# Patient Record
Sex: MALE | Race: BLACK OR AFRICAN AMERICAN | Employment: UNEMPLOYED | URBAN - METROPOLITAN AREA
[De-identification: names, ages, dates, MRNs, and addresses within clinical notes are randomized per-mention and may not be internally consistent; named-entity substitution may affect disease eponyms.]

---

## 2024-09-10 ENCOUNTER — HOSPITAL ENCOUNTER (INPATIENT)
Age: 64
LOS: 1 days | DRG: 287 | End: 2024-09-10
Attending: EMERGENCY MEDICINE | Admitting: INTERNAL MEDICINE
Payer: MEDICARE

## 2024-09-10 ENCOUNTER — APPOINTMENT (OUTPATIENT)
Dept: GENERAL RADIOLOGY | Age: 64
DRG: 287 | End: 2024-09-10
Payer: MEDICARE

## 2024-09-10 DIAGNOSIS — I21.4 NSTEMI (NON-ST ELEVATED MYOCARDIAL INFARCTION) (HCC): ICD-10-CM

## 2024-09-10 DIAGNOSIS — I21.3 ST ELEVATION MYOCARDIAL INFARCTION (STEMI), UNSPECIFIED ARTERY (HCC): Primary | ICD-10-CM

## 2024-09-10 DIAGNOSIS — R07.9 CHEST PAIN, UNSPECIFIED TYPE: ICD-10-CM

## 2024-09-10 DIAGNOSIS — I21.3 STEMI (ST ELEVATION MYOCARDIAL INFARCTION) (HCC): ICD-10-CM

## 2024-09-10 PROBLEM — I25.10 CAD (CORONARY ARTERY DISEASE): Status: ACTIVE | Noted: 2024-09-10

## 2024-09-10 LAB
ANION GAP SERPL CALCULATED.3IONS-SCNC: 8 MMOL/L (ref 3–16)
ANTI-XA UNFRAC HEPARIN: <0.1 IU/ML (ref 0.3–0.7)
APTT BLD: 31.6 SEC (ref 22.1–36.4)
BASOPHILS # BLD: 0 K/UL (ref 0–0.2)
BASOPHILS NFR BLD: 0.4 %
BUN SERPL-MCNC: 13 MG/DL (ref 7–20)
CALCIUM SERPL-MCNC: 9.4 MG/DL (ref 8.3–10.6)
CHLORIDE SERPL-SCNC: 103 MMOL/L (ref 99–110)
CO2 SERPL-SCNC: 24 MMOL/L (ref 21–32)
CREAT SERPL-MCNC: 1.2 MG/DL (ref 0.8–1.3)
DEPRECATED RDW RBC AUTO: 15.1 % (ref 12.4–15.4)
EKG ATRIAL RATE: 105 BPM
EKG ATRIAL RATE: 106 BPM
EKG DIAGNOSIS: NORMAL
EKG DIAGNOSIS: NORMAL
EKG P AXIS: 43 DEGREES
EKG P AXIS: 47 DEGREES
EKG P-R INTERVAL: 176 MS
EKG P-R INTERVAL: 186 MS
EKG Q-T INTERVAL: 334 MS
EKG Q-T INTERVAL: 338 MS
EKG QRS DURATION: 118 MS
EKG QRS DURATION: 118 MS
EKG QTC CALCULATION (BAZETT): 441 MS
EKG QTC CALCULATION (BAZETT): 448 MS
EKG R AXIS: 85 DEGREES
EKG R AXIS: 88 DEGREES
EKG T AXIS: 231 DEGREES
EKG T AXIS: 232 DEGREES
EKG VENTRICULAR RATE: 105 BPM
EKG VENTRICULAR RATE: 106 BPM
EOSINOPHIL # BLD: 0.1 K/UL (ref 0–0.6)
EOSINOPHIL NFR BLD: 2.3 %
GFR SERPLBLD CREATININE-BSD FMLA CKD-EPI: 67 ML/MIN/{1.73_M2}
GLUCOSE SERPL-MCNC: 125 MG/DL (ref 70–99)
HCT VFR BLD AUTO: 35.7 % (ref 40.5–52.5)
HGB BLD-MCNC: 11.9 G/DL (ref 13.5–17.5)
INR PPP: 1 (ref 0.85–1.15)
LYMPHOCYTES # BLD: 1.6 K/UL (ref 1–5.1)
LYMPHOCYTES NFR BLD: 25.4 %
MCH RBC QN AUTO: 29.5 PG (ref 26–34)
MCHC RBC AUTO-ENTMCNC: 33.4 G/DL (ref 31–36)
MCV RBC AUTO: 88.4 FL (ref 80–100)
MONOCYTES # BLD: 0.7 K/UL (ref 0–1.3)
MONOCYTES NFR BLD: 11.3 %
NEUTROPHILS # BLD: 3.8 K/UL (ref 1.7–7.7)
NEUTROPHILS NFR BLD: 60.6 %
NT-PROBNP SERPL-MCNC: 1636 PG/ML (ref 0–124)
PLATELET # BLD AUTO: 405 K/UL (ref 135–450)
PMV BLD AUTO: 8.3 FL (ref 5–10.5)
POC ACT LR: 168 SEC
POC ACT LR: 202 SEC
POTASSIUM SERPL-SCNC: 4.8 MMOL/L (ref 3.5–5.1)
PROTHROMBIN TIME: 13.4 SEC (ref 11.9–14.9)
RBC # BLD AUTO: 4.04 M/UL (ref 4.2–5.9)
SODIUM SERPL-SCNC: 135 MMOL/L (ref 136–145)
TROPONIN, HIGH SENSITIVITY: 371 NG/L (ref 0–22)
TROPONIN, HIGH SENSITIVITY: 443 NG/L (ref 0–22)
WBC # BLD AUTO: 6.3 K/UL (ref 4–11)

## 2024-09-10 PROCEDURE — 99153 MOD SED SAME PHYS/QHP EA: CPT | Performed by: INTERNAL MEDICINE

## 2024-09-10 PROCEDURE — 83880 ASSAY OF NATRIURETIC PEPTIDE: CPT

## 2024-09-10 PROCEDURE — 2709999900 HC NON-CHARGEABLE SUPPLY: Performed by: INTERNAL MEDICINE

## 2024-09-10 PROCEDURE — 1200000000 HC SEMI PRIVATE

## 2024-09-10 PROCEDURE — 2700000000 HC OXYGEN THERAPY PER DAY

## 2024-09-10 PROCEDURE — C1769 GUIDE WIRE: HCPCS | Performed by: INTERNAL MEDICINE

## 2024-09-10 PROCEDURE — 93567 NJX CAR CTH SPRVLV AORTGRPHY: CPT | Performed by: INTERNAL MEDICINE

## 2024-09-10 PROCEDURE — 85347 COAGULATION TIME ACTIVATED: CPT

## 2024-09-10 PROCEDURE — 85025 COMPLETE CBC W/AUTO DIFF WBC: CPT

## 2024-09-10 PROCEDURE — 5A1223Z PERFORMANCE OF CARDIAC PACING, CONTINUOUS: ICD-10-PCS | Performed by: INTERNAL MEDICINE

## 2024-09-10 PROCEDURE — B2131ZZ FLUOROSCOPY OF MULTIPLE CORONARY ARTERY BYPASS GRAFTS USING LOW OSMOLAR CONTRAST: ICD-10-PCS | Performed by: INTERNAL MEDICINE

## 2024-09-10 PROCEDURE — 85520 HEPARIN ASSAY: CPT

## 2024-09-10 PROCEDURE — 96374 THER/PROPH/DIAG INJ IV PUSH: CPT

## 2024-09-10 PROCEDURE — 92950 HEART/LUNG RESUSCITATION CPR: CPT

## 2024-09-10 PROCEDURE — B211YZZ FLUOROSCOPY OF MULTIPLE CORONARY ARTERIES USING OTHER CONTRAST: ICD-10-PCS | Performed by: INTERNAL MEDICINE

## 2024-09-10 PROCEDURE — 6360000002 HC RX W HCPCS: Performed by: EMERGENCY MEDICINE

## 2024-09-10 PROCEDURE — 99152 MOD SED SAME PHYS/QHP 5/>YRS: CPT | Performed by: INTERNAL MEDICINE

## 2024-09-10 PROCEDURE — 94761 N-INVAS EAR/PLS OXIMETRY MLT: CPT

## 2024-09-10 PROCEDURE — 93459 L HRT ART/GRFT ANGIO: CPT | Performed by: INTERNAL MEDICINE

## 2024-09-10 PROCEDURE — 0BH17EZ INSERTION OF ENDOTRACHEAL AIRWAY INTO TRACHEA, VIA NATURAL OR ARTIFICIAL OPENING: ICD-10-PCS | Performed by: INTERNAL MEDICINE

## 2024-09-10 PROCEDURE — 31500 INSERT EMERGENCY AIRWAY: CPT

## 2024-09-10 PROCEDURE — 85610 PROTHROMBIN TIME: CPT

## 2024-09-10 PROCEDURE — 85730 THROMBOPLASTIN TIME PARTIAL: CPT

## 2024-09-10 PROCEDURE — 71045 X-RAY EXAM CHEST 1 VIEW: CPT

## 2024-09-10 PROCEDURE — 6360000002 HC RX W HCPCS: Performed by: INTERNAL MEDICINE

## 2024-09-10 PROCEDURE — 99285 EMERGENCY DEPT VISIT HI MDM: CPT

## 2024-09-10 PROCEDURE — 2500000003 HC RX 250 WO HCPCS: Performed by: INTERNAL MEDICINE

## 2024-09-10 PROCEDURE — 80048 BASIC METABOLIC PNL TOTAL CA: CPT

## 2024-09-10 PROCEDURE — 5A12012 PERFORMANCE OF CARDIAC OUTPUT, SINGLE, MANUAL: ICD-10-PCS | Performed by: INTERNAL MEDICINE

## 2024-09-10 PROCEDURE — C1894 INTRO/SHEATH, NON-LASER: HCPCS | Performed by: INTERNAL MEDICINE

## 2024-09-10 PROCEDURE — 4A023N7 MEASUREMENT OF CARDIAC SAMPLING AND PRESSURE, LEFT HEART, PERCUTANEOUS APPROACH: ICD-10-PCS | Performed by: INTERNAL MEDICINE

## 2024-09-10 PROCEDURE — 93005 ELECTROCARDIOGRAM TRACING: CPT | Performed by: EMERGENCY MEDICINE

## 2024-09-10 PROCEDURE — 6370000000 HC RX 637 (ALT 250 FOR IP): Performed by: EMERGENCY MEDICINE

## 2024-09-10 PROCEDURE — 84484 ASSAY OF TROPONIN QUANT: CPT

## 2024-09-10 RX ORDER — ASPIRIN 81 MG/1
324 TABLET, CHEWABLE ORAL ONCE
Status: DISCONTINUED | OUTPATIENT
Start: 2024-09-10 | End: 2024-09-10

## 2024-09-10 RX ORDER — AMLODIPINE BESYLATE 2.5 MG/1
2.5 TABLET ORAL DAILY
COMMUNITY

## 2024-09-10 RX ORDER — RANOLAZINE 500 MG/1
500 TABLET, EXTENDED RELEASE ORAL 2 TIMES DAILY
COMMUNITY

## 2024-09-10 RX ORDER — ISOSORBIDE MONONITRATE 30 MG/1
30 TABLET, EXTENDED RELEASE ORAL DAILY
COMMUNITY

## 2024-09-10 RX ORDER — NITROGLYCERIN 0.4 MG/1
0.4 TABLET SUBLINGUAL ONCE
Status: COMPLETED | OUTPATIENT
Start: 2024-09-10 | End: 2024-09-10

## 2024-09-10 RX ORDER — HEPARIN SODIUM 10000 [USP'U]/100ML
5-30 INJECTION, SOLUTION INTRAVENOUS CONTINUOUS
Status: DISCONTINUED | OUTPATIENT
Start: 2024-09-10 | End: 2024-09-12 | Stop reason: HOSPADM

## 2024-09-10 RX ORDER — SUCCINYLCHOLINE CHLORIDE 20 MG/ML
INJECTION INTRAMUSCULAR; INTRAVENOUS PRN
Status: DISCONTINUED | OUTPATIENT
Start: 2024-09-10 | End: 2024-09-10 | Stop reason: HOSPADM

## 2024-09-10 RX ORDER — AMIODARONE HYDROCHLORIDE 150 MG/3ML
INJECTION, SOLUTION INTRAVENOUS PRN
Status: DISCONTINUED | OUTPATIENT
Start: 2024-09-10 | End: 2024-09-10 | Stop reason: HOSPADM

## 2024-09-10 RX ORDER — VALSARTAN 40 MG/1
40 TABLET ORAL DAILY
COMMUNITY

## 2024-09-10 RX ORDER — ONDANSETRON 2 MG/ML
4 INJECTION INTRAMUSCULAR; INTRAVENOUS ONCE
Status: COMPLETED | OUTPATIENT
Start: 2024-09-10 | End: 2024-09-10

## 2024-09-10 RX ORDER — HEPARIN SODIUM 1000 [USP'U]/ML
30 INJECTION, SOLUTION INTRAVENOUS; SUBCUTANEOUS PRN
Status: DISCONTINUED | OUTPATIENT
Start: 2024-09-10 | End: 2024-09-12 | Stop reason: HOSPADM

## 2024-09-10 RX ORDER — HEPARIN SODIUM 1000 [USP'U]/ML
60 INJECTION, SOLUTION INTRAVENOUS; SUBCUTANEOUS ONCE
Status: COMPLETED | OUTPATIENT
Start: 2024-09-10 | End: 2024-09-10

## 2024-09-10 RX ORDER — MIDAZOLAM HYDROCHLORIDE 1 MG/ML
INJECTION INTRAMUSCULAR; INTRAVENOUS PRN
Status: DISCONTINUED | OUTPATIENT
Start: 2024-09-10 | End: 2024-09-10 | Stop reason: HOSPADM

## 2024-09-10 RX ORDER — ATROPINE SULFATE 0.1 MG/ML
INJECTION INTRAVENOUS PRN
Status: DISCONTINUED | OUTPATIENT
Start: 2024-09-10 | End: 2024-09-10 | Stop reason: HOSPADM

## 2024-09-10 RX ORDER — ISOSORBIDE MONONITRATE 60 MG/1
60 TABLET, EXTENDED RELEASE ORAL DAILY
COMMUNITY

## 2024-09-10 RX ORDER — EPINEPHRINE 1 MG/ML(1)
AMPUL (ML) INJECTION PRN
Status: DISCONTINUED | OUTPATIENT
Start: 2024-09-10 | End: 2024-09-10 | Stop reason: HOSPADM

## 2024-09-10 RX ORDER — FUROSEMIDE 10 MG/ML
INJECTION INTRAMUSCULAR; INTRAVENOUS PRN
Status: DISCONTINUED | OUTPATIENT
Start: 2024-09-10 | End: 2024-09-10 | Stop reason: HOSPADM

## 2024-09-10 RX ORDER — HEPARIN SODIUM 1000 [USP'U]/ML
INJECTION, SOLUTION INTRAVENOUS; SUBCUTANEOUS PRN
Status: DISCONTINUED | OUTPATIENT
Start: 2024-09-10 | End: 2024-09-10 | Stop reason: HOSPADM

## 2024-09-10 RX ORDER — HEPARIN SODIUM 1000 [USP'U]/ML
60 INJECTION, SOLUTION INTRAVENOUS; SUBCUTANEOUS PRN
Status: DISCONTINUED | OUTPATIENT
Start: 2024-09-10 | End: 2024-09-12 | Stop reason: HOSPADM

## 2024-09-10 RX ORDER — LIDOCAINE HYDROCHLORIDE 10 MG/ML
INJECTION, SOLUTION EPIDURAL; INFILTRATION; INTRACAUDAL; PERINEURAL PRN
Status: DISCONTINUED | OUTPATIENT
Start: 2024-09-10 | End: 2024-09-10 | Stop reason: HOSPADM

## 2024-09-10 RX ORDER — DIGOXIN 0.25 MG/ML
INJECTION INTRAMUSCULAR; INTRAVENOUS PRN
Status: DISCONTINUED | OUTPATIENT
Start: 2024-09-10 | End: 2024-09-10 | Stop reason: HOSPADM

## 2024-09-10 RX ORDER — NITROGLYCERIN 0.4 MG/1
0.4 TABLET SUBLINGUAL EVERY 5 MIN PRN
COMMUNITY

## 2024-09-10 RX ADMIN — HEPARIN SODIUM 12 UNITS/KG/HR: 10000 INJECTION, SOLUTION INTRAVENOUS at 05:48

## 2024-09-10 RX ADMIN — ONDANSETRON 4 MG: 2 INJECTION INTRAMUSCULAR; INTRAVENOUS at 06:07

## 2024-09-10 RX ADMIN — HEPARIN SODIUM 3500 UNITS: 1000 INJECTION INTRAVENOUS; SUBCUTANEOUS at 05:44

## 2024-09-10 RX ADMIN — NITROGLYCERIN 0.4 MG: 0.4 TABLET, ORALLY DISINTEGRATING SUBLINGUAL at 05:44

## 2024-09-10 ASSESSMENT — PAIN - FUNCTIONAL ASSESSMENT: PAIN_FUNCTIONAL_ASSESSMENT: 0-10

## 2024-09-10 ASSESSMENT — PAIN DESCRIPTION - ORIENTATION: ORIENTATION: MID

## 2024-09-10 ASSESSMENT — LIFESTYLE VARIABLES
HOW OFTEN DO YOU HAVE A DRINK CONTAINING ALCOHOL: NEVER
HOW MANY STANDARD DRINKS CONTAINING ALCOHOL DO YOU HAVE ON A TYPICAL DAY: PATIENT DOES NOT DRINK

## 2024-09-10 ASSESSMENT — PAIN SCALES - GENERAL: PAINLEVEL_OUTOF10: 8

## 2024-09-10 ASSESSMENT — PAIN DESCRIPTION - LOCATION
LOCATION: CHEST
LOCATION: CHEST

## 2024-09-10 ASSESSMENT — PAIN DESCRIPTION - DESCRIPTORS: DESCRIPTORS: STABBING

## 2024-09-10 NOTE — CONSULTS
The [facility]    Cardiology Consult/H&P  Consulting Cardiologist Daniel Coronel MD , [facility]  Referring Provider:  No primary care provider on file.    9/10/2024, 8:39 AM    Chief Complaint   Patient presents with    Chest Pain     Chest pain gave 325 of Asprin en route, d/t language barrier, could not obtain background known TB outbreak in community started about yesterday morning      Primary Cardiologist:  Asked by Daniel Coronel MD/No primary care provider on file. to evaluate his patient    Reason for consult chest pain non-STEMI    History of Present Illness:   Jose Lepe is a 64 y.o. male presented to the emergency department with several hours of continuing chest pains.  Shortness of breath dyspnea and emesis.  He has a known history of CAD with previous superior CAD did undergo bypass graft surgery 6 vessel in 2018 that being the LIMA to the LAD.  Saphenous great vein graft obtuse marginal and twos marginal 2 sequential.  Saphenous vein graft diagonal 1 PDA in a.m. at [facility].  History of paroxysmal atrial fibrillation hypertension diabetes polycystic kidney disease and he has had also several coronary stents.  In the emergency department the EKG did not show michelle STEMI but shows significant ST and T wave changes with tachycardia.  We elected that to bring the patient to the cardiac Cath Lab because the instability of his rhythm and instability of his symptoms and suggestion of acute severe ischemia.  There is language barrier and then the patient speaks Fulani sand Swiss.  We did not have interpreters around.  To the Cath Lab for assessment and treatment.       Past Medical History:   has a past medical history of CAD (coronary artery disease), Hyperlipidemia, and Hypertension.    Surgical History:   has a past surgical history that includes Cardiac surgery.     Social History:   reports that he has never smoked. He has never used smokeless tobacco. He  (APRESOLINE) 50 MG tablet Take 50 mg by mouth 3 times daily  Patient not taking: Reported on 9/10/2024 6/1/20   Unique Decker MD   ibuprofen (ADVIL;MOTRIN) 200 MG tablet Take 200 mg by mouth every 6 hours as needed  Patient not taking: Reported on 9/10/2024    Unique Decker MD   metoprolol succinate (TOPROL XL) 100 MG extended release tablet TAKE 1 TABLET BY MOUTH EVERY DAY  Patient not taking: Reported on 9/10/2024 1/5/21   Unique Decker MD        Current Medications:  Current Facility-Administered Medications   Medication Dose Route Frequency Provider Last Rate Last Admin    heparin (porcine) injection 3,500 Units  60 Units/kg IntraVENous PRN Michaela Young MD        heparin (porcine) injection 1,800 Units  30 Units/kg IntraVENous PRN Michaela Young MD        heparin 25,000 units in dextrose 5% 250 mL (premix) infusion  5-30 Units/kg/hr IntraVENous Continuous Michaela Young MD 7 mL/hr at 09/10/24 0548 12 Units/kg/hr at 09/10/24 0548    midazolam (VERSED) injection    PRDaniel Mercedes MD   2 mg at 09/10/24 0813    fentaNYL (SUBLIMAZE) injection    PRDaniel Mercedes MD   50 mcg at 09/10/24 0728    lidocaine PF 1 % injection    PRDaniel Mercedes MD   5 mL at 09/10/24 0729    heparin (porcine) injection    PRDaniel Mercedes MD   3,000 Units at 09/10/24 0758    digoxin (LANOXIN) injection    PRDaniel Mercedes MD   250 mcg at 09/10/24 0740    amiodarone (CORDARONE) injection    Daniel Hou MD   150 mg at 09/10/24 0746    furosemide (LASIX) injection    Daniel Hou MD   40 mg at 09/10/24 0756    atropine injection    Daniel Hou MD   1 mg at 09/10/24 0804    EPINEPHrine (1 mg/mL) injection    Daniel Hou MD   1 mg at 09/10/24 0816    succinylcholine (ANECTINE) injection    PRDaniel Mercedes MD   100 mg at 09/10/24 0812       Allergies:  Patient has no known allergies.     Review of Systems:   Constitutional: there has been no  [phone]    This chart was likely completed using voice recognition technology and may contain unintended grammatical , phraseology,and/or punctuation errors  Daniel Coronel MD , [facility]  9/10/97409:39 AM

## 2024-09-10 NOTE — ED PROVIDER NOTES
THE [facility]  EMERGENCY DEPARTMENT ENCOUNTER          ATTENDING PHYSICIAN NOTE       Date of evaluation: 9/10/2024    Chief Complaint     Chest Pain (Chest pain gave 325 of Asprin en route, d/t language barrier, could not obtain background known TB outbreak in community started about yesterday morning)      History of Present Illness     Jose Lepe is a 64 y.o. male who presents with a chief complaint of chest pain.  Patient states he has been having chest pain on and off since yesterday morning but pain is so severe he now feels like he cannot move.  Called 911.  Given 325 of aspirin and route.  Patient has a history of a CABG and follows at [facility].  Was supposed to undergo an outpatient cardiac catheterization on October 10.  Patient states with the pain that he is having right now in the middle of his chest he does have some associated nausea but no vomiting.  Also some mild associated shortness of breath.  Does not know if this feels like his previous heart problem.  Of note the patient speaks to fulani and [place]an, history is limited due to lack of fulani , [place]an  used.  Patient has not taken any of his medications today.  Takes aspirin, Brilinta, amlodipine, metoprolol.  Has nitroglycerin tablets in his pocket.  On chart review I can see that patient had a 6 vessel CABG in 2018 (LIMA-LAD, SVG-OM1-OM2 sequential, SVG-Diag, RQH-H-RPQ-AM) at [facility].  Also has a history of paroxysmal A-fib, hypertension, diabetes, polycystic kidney disease, GERD.    ASSESSMENT / PLAN  (MEDICAL DECISION MAKING)     INITIAL VITALS: BP: 119/86, Temp: 97.8 °F (36.6 °C), Pulse: (!) 119, Respirations: 18, SpO2: 100 %      Jose Lepe is a 64 y.o. male who presents with a chief complaint of chest pain.  Initial exam reveals a male in no acute distress with tachycardia but otherwise normal vitals, afebrile.  Physical exam remarkable for normal heart and lungs.  Patient able to speak in  Hyperlipidemia, and Hypertension.  He has a past surgical history that includes Cardiac surgery.  His family history is not on file.  He reports that he has never smoked. He has never used smokeless tobacco. He reports that he does not currently use alcohol. He reports that he does not use drugs.    Medications     Previous Medications    ACETAMINOPHEN (TYLENOL) 325 MG TABLET    Take 650 mg by mouth every 6 hours as needed    AMLODIPINE (NORVASC) 2.5 MG TABLET    Take 1 tablet by mouth daily    APIXABAN (ELIQUIS) 2.5 MG TABS TABLET    Take 2.5 mg by mouth 2 times daily    ASPIRIN 81 MG EC TABLET    Take 1 tablet by mouth daily    ATORVASTATIN (LIPITOR) 80 MG TABLET    Take 1 tablet by mouth daily    HYDRALAZINE (APRESOLINE) 50 MG TABLET    Take 50 mg by mouth 3 times daily    IBUPROFEN (ADVIL;MOTRIN) 200 MG TABLET    Take 200 mg by mouth every 6 hours as needed    ISOSORBIDE MONONITRATE (IMDUR) 30 MG EXTENDED RELEASE TABLET    Take 1 tablet by mouth daily    ISOSORBIDE MONONITRATE (IMDUR) 60 MG EXTENDED RELEASE TABLET    Take 1 tablet by mouth daily    METFORMIN (GLUCOPHAGE) 500 MG TABLET    Take 1 tablet by mouth 2 times daily (with meals)    METOPROLOL SUCCINATE (TOPROL XL) 100 MG EXTENDED RELEASE TABLET    TAKE 1 TABLET BY MOUTH EVERY DAY    NAPROXEN (NAPROSYN) 500 MG TABLET    TAKE 1/2 TABLET BY MOUTH 2 TIMES DAILY (WITH MEALS)    NITROGLYCERIN (NITROSTAT) 0.4 MG SL TABLET    Place 1 tablet under the tongue every 5 minutes as needed for Chest pain up to max of 3 total doses. If no relief after 1 dose, call 911.    RANOLAZINE (RANEXA) 500 MG EXTENDED RELEASE TABLET    Take 1 tablet by mouth 2 times daily    TICAGRELOR (BRILINTA) 90 MG TABS TABLET    Take 1 tablet by mouth 2 times daily    VALSARTAN (DIOVAN) 40 MG TABLET    Take 1 tablet by mouth daily       Allergies     He has No Known Allergies.    Physical Exam     INITIAL VITALS: BP: 119/86, Temp: 97.8 °F (36.6 °C), Pulse: (!) 119, Respirations: 18, SpO2: 100  %   Physical Exam  Constitutional:       General: He is not in acute distress.     Appearance: He is well-developed. He is not diaphoretic.   HENT:      Head: Normocephalic and atraumatic.      Mouth/Throat:      Mouth: Mucous membranes are moist.      Pharynx: No oropharyngeal exudate.   Eyes:      General:         Right eye: No discharge.         Left eye: No discharge.      Conjunctiva/sclera: Conjunctivae normal.      Pupils: Pupils are equal, round, and reactive to light.   Neck:      Thyroid: No thyromegaly.      Vascular: No JVD.      Trachea: No tracheal deviation.   Cardiovascular:      Rate and Rhythm: Regular rhythm. Tachycardia present.      Heart sounds: Normal heart sounds. No murmur heard.     No friction rub. No gallop.   Pulmonary:      Effort: Pulmonary effort is normal. No respiratory distress.      Breath sounds: Normal breath sounds. No stridor. No wheezing or rales.   Chest:      Chest wall: No tenderness.   Abdominal:      General: Bowel sounds are normal. There is no distension.      Palpations: Abdomen is soft.      Tenderness: There is no abdominal tenderness. There is no guarding or rebound.   Musculoskeletal:         General: No tenderness or deformity. Normal range of motion.      Cervical back: Normal range of motion and neck supple.   Skin:     General: Skin is warm and dry.      Findings: No erythema or rash.   Neurological:      Mental Status: He is alert and oriented to person, place, and time.      Cranial Nerves: No cranial nerve deficit.      Motor: No abnormal muscle tone.      Coordination: Coordination normal.   Psychiatric:         Behavior: Behavior normal.                    Michaela Young MD  09/10/24 0639       Michaela Young MD  09/10/24 0653

## 2024-09-10 NOTE — PROGRESS NOTES
09/10/24 0836   Encounter Summary   Encounter Overview/Reason Crisis   Service Provided For Patient   Support System Unknown  (Team seeking NOK  or other.)   Complexity of Encounter High   Begin Time 0801   End Time  0837   Total Time Calculated 36 min   Crisis   Type Code Blue   Grief, Loss, and Adjustments   Type Death  (No family found at this time.)   Assessment/Intervention/Outcome   Assessment Unable to assess   Intervention Sustaining Presence/Ministry of presence   Outcome Deescalated     Spiritual Health Assessment/Progress Note  [facility]    (P) Crisis, (P) Code Blue, (P) Death (No family found at this time.),      Name: Jose Lepe MRN: [id number]    Age: 64 y.o.     Sex: male   Language: Other   Sikh: None   CAD (coronary artery disease)     Date: 9/10/2024            Total Time Calculated: (P) 36 min              Spiritual Assessment began in THE [facility] PROCEDURES            Encounter Overview/Reason: (P) Crisis  Service Provided For: (P) Patient    Dimple, Belief, Meaning:   Patient unable to communicate at this time  Family/Friends No family/friends present      Importance and Influence:  Patient unable to communicate at this time  Family/Friends no family/friends present    Community:  Unknown - / Message[employer] Manager Brandon made several attempts to call the provided phone numbers with no answer. However, one voice mail was left.     then informed the  should someone walk in and ask.     Assessment and Plan of Care:       Patient Plan of Care: Spiritual Care available upon further referral  Family/Friends Plan of Care: Spiritual Care available upon further referral    Electronically signed by AGUS Bunn on 9/10/2024 at 8:39 AM

## 2024-09-10 NOTE — ED NOTES
Patient speaks Fulani, but no  was available in that language. Sammarinese was used instead and worked well.      Chasidy Lundberg, RN  09/10/24 0671

## 2024-09-10 NOTE — PROCEDURES
Code called to Cath Lab and pt in extremis and not breathing.  RT at head of bed and attempting to ventilate.  Discussed with cardiology and decision made to intubate.  Pt given 2mg versed and 100mg sux with adequate conditions for RSI.  One attempt with cmac and placement of 8.0 tube.  Confirmation via color change and pt respiration handed off to RT.      Carlos Cha MD  
PROCEDURE NOTE  Date: 9/10/2024   Name: Jose Lepe  YOB: 1960    Procedures      Left heart catheterization  Selective coronary arteriogram  Selective saphenous vein graft injections  Left and subclavian injection  Full throat CPR  Temporary pacemaker insertion  Access from the right femoral artery      This patient currently aged 64 presented to the emergency department with chest pain shortness of breath.  He had EKG changes that did not equal to a STEMI.  He was observed in the ED for a period of time and then he apparently got more short of breath more second and had emesis and was then labeled as a STEMI.  We saw him early in the morning.  I did not see a STEMI on the EKG but he does have a wide-complex and tachycardia.  We elected to bring him emergently to the Cath Lab because it appears that this was probably an acute cardiac event.  He does have known history of PTCA and stents.  Known history of bypass graft surgery.  And apparently had been experiencing some discomfort recently had been suggested to have an evaluation at [facility] for angiography.  I do not have a family here and never saw the patient able to actually have a discussion.  He does not speak English very well and communication issues.    Nevertheless we took him to the Cath Lab and in the Cath Lab we were able to access his right femoral artery.  We injected the coronary arteries and found that he only has 1 functional vessel.  Whenever we injected contrast into his vessels the contrast actually did not move very well at all.  The left main seems to be normal but heavily calcified.  The LAD has previous stents and is patent.  The circumflex is completely occluded and we do not find the ostium of the circumflex.  Right coronary artery has previously been extensively stented.  It is completely occluded and appears to be chronically old.    Saphenous vein graft we found 3 numbers and they were all 
3

## 2024-09-10 NOTE — SIGNIFICANT EVENT
Name: Jose Lepe            Admitted: 9/10/2024     Significant event:  Code Blue    Pt w/ a known history of PTCA, stents of LAD and RCA as well as CABG.  Patient presented to the ED previously on 8/21/24 for similar complaint of chest pain, with nonischemic EKG. the patient became chest free during that visit and was discharged with close outpatient follow-up later that day with cardiology.  The patient speaks Fulani and is not very fluent in English.    ICU team was called to bedside because of asystole.    The patient initially presented this morning to the ED with chest pain and shortness of breath, EKG however did not show STEMI.  He developed a wide-complex tachycardia and he was brought to the Cath Lab emergently.  The patient went into asystole.  Chest compressions were started at 805 and was subsequently intubated.  He received several rounds of epinephrine and atropine and did have a temporary pacemaker inserted but unfortunately patient kept going into V. tach's and passed away at 8:20 AM.    Unable to contact family. 's Office was contacted.      /80   Pulse (!) 102   Temp 97.8 °F (36.6 °C)   Resp (!) 32   Ht 1.702 m (5' 7\")   Wt 58.7 kg (129 lb 6.6 oz)   SpO2 100%   BMI 20.27 kg/m²     Code:No Order    Mia Ruiz MD PGY-1    9/10/2024  8:26 AM

## 2024-09-10 NOTE — ED PROVIDER NOTES
THE [facility]  EMERGENCY DEPARTMENT ENCOUNTER          ATTENDING PHYSICIAN NOTE       Date of evaluation: 9/10/2024    ADDENDUM:      Care of this patient was assumed from Dr. Young.  The patient was seen for Chest Pain (Chest pain gave 325 of Asprin en route, d/t language barrier, could not obtain background known TB outbreak in community started about yesterday morning)  .  The patient's initial evaluation and plan have been discussed with the prior provider who initially evaluated the patient.  Nursing Notes, Past Medical Hx, Past Surgical Hx, Social Hx, Allergies, and Family Hx were all reviewed.      MEDICAL DECISION MAKING / ASSESSMENT / PLAN     Jose Lepe is a 64 y.o. male with a PMH inclusive of cardiac history with CABG who follows with Cardiology [facility] who presents for evaluation of chest pain. Having CP, SOB, nausea. Got heparin, ASA load (per EMS). Cath lab activated.     Update: Patient wheeled to Cath Lab as I was receiving signout from outgoing provider.  I did not personally care for this patient aside from placing consult to ICU.    Clinical Impression     1. ST elevation myocardial infarction (STEMI), unspecified artery (HCC)    2. NSTEMI (non-ST elevated myocardial infarction) ([facility])    3. Chest pain, unspecified type        Disposition     PATIENT REFERRED TO:  No follow-up provider specified.    DISCHARGE MEDICATIONS:  New Prescriptions    No medications on file       DISPOSITION Decision To Admit 09/10/2024 06:53:04 AM  Condition at Disposition: Fair      Procedures         ED Course          The patient was given the following medications:  Orders Placed This Encounter   Medications    nitroGLYCERIN (NITROSTAT) SL tablet 0.4 mg    heparin (porcine) injection 3,500 Units    heparin (porcine) injection 3,500 Units    heparin (porcine) injection 1,800 Units    heparin 25,000 units in dextrose 5% 250 mL (premix) infusion    DISCONTD: aspirin chewable tablet 324 mg    ondansetron (ZOFRAN)  ([id number]),  THEA SNOWDEN ([id number]) on 9/10/2024 5:32:09 AM         RECENT VITALS:  BP: 113/80, Temp: 97.8 °F (36.6 °C), Pulse: (!) 102, Respirations: (!) 32, SpO2: 92 %         Brooke Bragg MD  09/11/24 6137

## 2024-09-11 VITALS
SYSTOLIC BLOOD PRESSURE: 113 MMHG | TEMPERATURE: 97.8 F | BODY MASS INDEX: 20.31 KG/M2 | OXYGEN SATURATION: 100 % | HEART RATE: 102 BPM | WEIGHT: 129.41 LBS | DIASTOLIC BLOOD PRESSURE: 80 MMHG | HEIGHT: 67 IN | RESPIRATION RATE: 14 BRPM

## 2024-09-11 NOTE — PROGRESS NOTES
Pt intubated due to respiratory failure. Pt given 2mg versed and 100mg Succinylcholine for sedation pre-procedure. Pt has a class 3 Mallampati Score. Size 3 jean-baptiste larygoscope used to visualize vocal chords. Size 8mm ET tube placed through the vocal chords to 24 cm at the teeth. ET tube cuff inflated to MOV. Positive color change on the etco2 detector. Bilateral breath sounds heard. No visible trauma noted.

## (undated) DEVICE — NEEDLE ANGIO 1 WALL STYL 18 GAX70 CM THN ADV

## (undated) DEVICE — Device

## (undated) DEVICE — CATHETER 5FR AR MOD CORDIS 100CM

## (undated) DEVICE — CATH LAB PACK: Brand: MEDLINE INDUSTRIES, INC.

## (undated) DEVICE — PAD, DEFIB, ADULT, RADIOTRANS, PHYSIO: Brand: MEDLINE

## (undated) DEVICE — GUIDEWIRE VASC IMAG 0.035INX150CM 5MM FLX FIRM TIP 3MM J

## (undated) DEVICE — CATHETER COR DIAG PIGTAILS PIG 145 CRV 5FR 110CM 6 SIDE H

## (undated) DEVICE — PINNACLE INTRODUCER SHEATH: Brand: PINNACLE